# Patient Record
Sex: MALE | Race: WHITE | NOT HISPANIC OR LATINO | Employment: UNEMPLOYED | URBAN - METROPOLITAN AREA
[De-identification: names, ages, dates, MRNs, and addresses within clinical notes are randomized per-mention and may not be internally consistent; named-entity substitution may affect disease eponyms.]

---

## 2021-02-19 ENCOUNTER — HOSPITAL ENCOUNTER (EMERGENCY)
Facility: HOSPITAL | Age: 21
Discharge: HOME | End: 2021-02-19
Attending: EMERGENCY MEDICINE | Admitting: EMERGENCY MEDICINE

## 2021-02-19 VITALS
SYSTOLIC BLOOD PRESSURE: 113 MMHG | DIASTOLIC BLOOD PRESSURE: 73 MMHG | TEMPERATURE: 98 F | OXYGEN SATURATION: 97 % | HEART RATE: 72 BPM | RESPIRATION RATE: 16 BRPM | HEIGHT: 68 IN

## 2021-02-19 DIAGNOSIS — F19.10 SUBSTANCE ABUSE (HCC): Primary | ICD-10-CM

## 2021-02-19 LAB
ALBUMIN SERPL-MCNC: 4.5 G/DL (ref 3.5–5.2)
ALBUMIN/GLOB SERPL: 1.6 G/DL
ALP SERPL-CCNC: 65 U/L (ref 39–117)
ALT SERPL W P-5'-P-CCNC: 22 U/L (ref 1–41)
AMPHET+METHAMPHET UR QL: POSITIVE
ANION GAP SERPL CALCULATED.3IONS-SCNC: 11.6 MMOL/L (ref 5–15)
AST SERPL-CCNC: 21 U/L (ref 1–40)
BARBITURATES UR QL SCN: NEGATIVE
BASOPHILS # BLD AUTO: 0.03 10*3/MM3 (ref 0–0.2)
BASOPHILS NFR BLD AUTO: 0.4 % (ref 0–1.5)
BENZODIAZ UR QL SCN: POSITIVE
BILIRUB SERPL-MCNC: 0.7 MG/DL (ref 0–1.2)
BILIRUB UR QL STRIP: NEGATIVE
BUN SERPL-MCNC: 14 MG/DL (ref 6–20)
BUN/CREAT SERPL: 13.9 (ref 7–25)
CALCIUM SPEC-SCNC: 8.9 MG/DL (ref 8.6–10.5)
CANNABINOIDS SERPL QL: POSITIVE
CHLORIDE SERPL-SCNC: 101 MMOL/L (ref 98–107)
CLARITY UR: CLEAR
CO2 SERPL-SCNC: 29.4 MMOL/L (ref 22–29)
COCAINE UR QL: POSITIVE
COLOR UR: YELLOW
CREAT SERPL-MCNC: 1.01 MG/DL (ref 0.76–1.27)
DEPRECATED RDW RBC AUTO: 43.1 FL (ref 37–54)
EOSINOPHIL # BLD AUTO: 0.21 10*3/MM3 (ref 0–0.4)
EOSINOPHIL NFR BLD AUTO: 2.5 % (ref 0.3–6.2)
ERYTHROCYTE [DISTWIDTH] IN BLOOD BY AUTOMATED COUNT: 12.8 % (ref 12.3–15.4)
ETHANOL BLD-MCNC: 105 MG/DL (ref 0–10)
ETHANOL UR QL: 0.1 %
GFR SERPL CREATININE-BSD FRML MDRD: 92 ML/MIN/1.73
GLOBULIN UR ELPH-MCNC: 2.8 GM/DL
GLUCOSE SERPL-MCNC: 90 MG/DL (ref 65–99)
GLUCOSE UR STRIP-MCNC: NEGATIVE MG/DL
HCT VFR BLD AUTO: 46.6 % (ref 37.5–51)
HGB BLD-MCNC: 16.3 G/DL (ref 13–17.7)
HGB UR QL STRIP.AUTO: NEGATIVE
IMM GRANULOCYTES # BLD AUTO: 0.03 10*3/MM3 (ref 0–0.05)
IMM GRANULOCYTES NFR BLD AUTO: 0.4 % (ref 0–0.5)
KETONES UR QL STRIP: ABNORMAL
LEUKOCYTE ESTERASE UR QL STRIP.AUTO: NEGATIVE
LYMPHOCYTES # BLD AUTO: 2.1 10*3/MM3 (ref 0.7–3.1)
LYMPHOCYTES NFR BLD AUTO: 24.6 % (ref 19.6–45.3)
MCH RBC QN AUTO: 32.7 PG (ref 26.6–33)
MCHC RBC AUTO-ENTMCNC: 35 G/DL (ref 31.5–35.7)
MCV RBC AUTO: 93.6 FL (ref 79–97)
METHADONE UR QL SCN: NEGATIVE
MONOCYTES # BLD AUTO: 1.03 10*3/MM3 (ref 0.1–0.9)
MONOCYTES NFR BLD AUTO: 12.1 % (ref 5–12)
NEUTROPHILS NFR BLD AUTO: 5.12 10*3/MM3 (ref 1.7–7)
NEUTROPHILS NFR BLD AUTO: 60 % (ref 42.7–76)
NITRITE UR QL STRIP: NEGATIVE
NRBC BLD AUTO-RTO: 0 /100 WBC (ref 0–0.2)
OPIATES UR QL: NEGATIVE
OXYCODONE UR QL SCN: NEGATIVE
PH UR STRIP.AUTO: 5.5 [PH] (ref 5–8)
PLATELET # BLD AUTO: 248 10*3/MM3 (ref 140–450)
PMV BLD AUTO: 10.7 FL (ref 6–12)
POTASSIUM SERPL-SCNC: 4.1 MMOL/L (ref 3.5–5.2)
PROT SERPL-MCNC: 7.3 G/DL (ref 6–8.5)
PROT UR QL STRIP: NEGATIVE
RBC # BLD AUTO: 4.98 10*6/MM3 (ref 4.14–5.8)
SODIUM SERPL-SCNC: 142 MMOL/L (ref 136–145)
SP GR UR STRIP: 1.02 (ref 1–1.03)
UROBILINOGEN UR QL STRIP: ABNORMAL
WBC # BLD AUTO: 8.52 10*3/MM3 (ref 3.4–10.8)

## 2021-02-19 PROCEDURE — 80307 DRUG TEST PRSMV CHEM ANLYZR: CPT | Performed by: EMERGENCY MEDICINE

## 2021-02-19 PROCEDURE — 81003 URINALYSIS AUTO W/O SCOPE: CPT | Performed by: NURSE PRACTITIONER

## 2021-02-19 PROCEDURE — 85025 COMPLETE CBC W/AUTO DIFF WBC: CPT | Performed by: NURSE PRACTITIONER

## 2021-02-19 PROCEDURE — 90791 PSYCH DIAGNOSTIC EVALUATION: CPT | Performed by: MARRIAGE & FAMILY THERAPIST

## 2021-02-19 PROCEDURE — 82077 ASSAY SPEC XCP UR&BREATH IA: CPT | Performed by: EMERGENCY MEDICINE

## 2021-02-19 PROCEDURE — 80053 COMPREHEN METABOLIC PANEL: CPT | Performed by: NURSE PRACTITIONER

## 2021-02-19 PROCEDURE — 99283 EMERGENCY DEPT VISIT LOW MDM: CPT

## 2021-02-19 NOTE — ED PROVIDER NOTES
EMERGENCY DEPARTMENT ENCOUNTER    Room Number:  10/10  Date of encounter:  2/19/2021  PCP: Provider, No Known  Historian: Patient and mother      PPE     Patient was placed in face mask in first look. Patient was wearing facemask when I entered the room and throughout our encounter. I wore full protective equipment throughout this patient encounter including a face mask, and gloves. Hand hygiene was performed before donning protective equipment and after removal when leaving the room.        HPI:  Chief Complaint: Possible substance abuse  A complete HPI/ROS/PMH/PSH/SH/FH are unobtainable due to: Patient not forthcoming with information    Context: Harshal Alcaraz is a 21 y.o. male who arrives to the ED via private vehicle with his mother.  Patient presents with his mom in the waiting room for what appears to be depression and possible substance abuse.  Patient states that he slept in his mom's car last night and she was concerned so she brought him here to the ER.  After speaking with patient's mom who is in the waiting room she states patient has made multiple comments about suicide.  She states that he told her he has made his own obituary, she states he has been referring to a good friend of his that committed suicide.  She states she is concerned that he has been using heroin, states he told her that he has not slept in multiple night.  Mom states therefore she brought him in to be evaluated.      PAST MEDICAL HISTORY  Active Ambulatory Problems     Diagnosis Date Noted   • No Active Ambulatory Problems     Resolved Ambulatory Problems     Diagnosis Date Noted   • No Resolved Ambulatory Problems     No Additional Past Medical History         PAST SURGICAL HISTORY  No past surgical history on file.      FAMILY HISTORY  No family history on file.      SOCIAL HISTORY  Social History     Socioeconomic History   • Marital status: Single     Spouse name: Not on file   • Number of children: Not on file   • Years  of education: Not on file   • Highest education level: Not on file   Tobacco Use   • Smoking status: Never Smoker         ALLERGIES  Patient has no known allergies.        REVIEW OF SYSTEMS  Review of Systems   Limited secondary to patient not being forthcoming with information       PHYSICAL EXAM    ED Triage Vitals   Temp Heart Rate Resp BP SpO2   02/19/21 0726 02/19/21 0714 02/19/21 0714 02/19/21 0726 02/19/21 0714   98 °F (36.7 °C) 81 18 130/87 97 %       Physical Exam  GENERAL: Disheveled appearance, non-toxic appearing, not distressed  HENT: normocephalic, atraumatic  EYES: no scleral icterus, PERRL  CV: regular rhythm, regular rate, no murmur  RESPIRATORY: normal effort, CTAB  ABDOMEN: soft   MUSCULOSKELETAL: no deformity  NEURO: alert, moves all extremities, follows commands, mental status normal/baseline  SKIN: warm, dry, no rash   Psych: Flat affect, patient denies SI or HI  Nursing notes and vital signs reviewed      LAB RESULTS  Recent Results (from the past 24 hour(s))   Ethanol    Collection Time: 02/19/21  7:36 AM    Specimen: Blood   Result Value Ref Range    Ethanol 105 (H) 0 - 10 mg/dL    Ethanol % 0.105 %   Comprehensive Metabolic Panel    Collection Time: 02/19/21  7:36 AM    Specimen: Blood   Result Value Ref Range    Glucose 90 65 - 99 mg/dL    BUN 14 6 - 20 mg/dL    Creatinine 1.01 0.76 - 1.27 mg/dL    Sodium 142 136 - 145 mmol/L    Potassium 4.1 3.5 - 5.2 mmol/L    Chloride 101 98 - 107 mmol/L    CO2 29.4 (H) 22.0 - 29.0 mmol/L    Calcium 8.9 8.6 - 10.5 mg/dL    Total Protein 7.3 6.0 - 8.5 g/dL    Albumin 4.50 3.50 - 5.20 g/dL    ALT (SGPT) 22 1 - 41 U/L    AST (SGOT) 21 1 - 40 U/L    Alkaline Phosphatase 65 39 - 117 U/L    Total Bilirubin 0.7 0.0 - 1.2 mg/dL    eGFR Non African Amer 92 >60 mL/min/1.73    Globulin 2.8 gm/dL    A/G Ratio 1.6 g/dL    BUN/Creatinine Ratio 13.9 7.0 - 25.0    Anion Gap 11.6 5.0 - 15.0 mmol/L   CBC Auto Differential    Collection Time: 02/19/21  8:27 AM     Specimen: Blood   Result Value Ref Range    WBC 8.52 3.40 - 10.80 10*3/mm3    RBC 4.98 4.14 - 5.80 10*6/mm3    Hemoglobin 16.3 13.0 - 17.7 g/dL    Hematocrit 46.6 37.5 - 51.0 %    MCV 93.6 79.0 - 97.0 fL    MCH 32.7 26.6 - 33.0 pg    MCHC 35.0 31.5 - 35.7 g/dL    RDW 12.8 12.3 - 15.4 %    RDW-SD 43.1 37.0 - 54.0 fl    MPV 10.7 6.0 - 12.0 fL    Platelets 248 140 - 450 10*3/mm3    Neutrophil % 60.0 42.7 - 76.0 %    Lymphocyte % 24.6 19.6 - 45.3 %    Monocyte % 12.1 (H) 5.0 - 12.0 %    Eosinophil % 2.5 0.3 - 6.2 %    Basophil % 0.4 0.0 - 1.5 %    Immature Grans % 0.4 0.0 - 0.5 %    Neutrophils, Absolute 5.12 1.70 - 7.00 10*3/mm3    Lymphocytes, Absolute 2.10 0.70 - 3.10 10*3/mm3    Monocytes, Absolute 1.03 (H) 0.10 - 0.90 10*3/mm3    Eosinophils, Absolute 0.21 0.00 - 0.40 10*3/mm3    Basophils, Absolute 0.03 0.00 - 0.20 10*3/mm3    Immature Grans, Absolute 0.03 0.00 - 0.05 10*3/mm3    nRBC 0.0 0.0 - 0.2 /100 WBC   Urine Drug Screen - Urine, Clean Catch    Collection Time: 02/19/21  8:34 AM    Specimen: Urine, Clean Catch   Result Value Ref Range    Amphet/Methamphet, Screen Positive (A) Negative    Barbiturates Screen, Urine Negative Negative    Benzodiazepine Screen, Urine Positive (A) Negative    Cocaine Screen, Urine Positive (A) Negative    Opiate Screen Negative Negative    THC, Screen, Urine Positive (A) Negative    Methadone Screen, Urine Negative Negative    Oxycodone Screen, Urine Negative Negative   Urinalysis With Microscopic If Indicated (No Culture) - Urine, Clean Catch    Collection Time: 02/19/21  8:34 AM    Specimen: Urine, Clean Catch   Result Value Ref Range    Color, UA Yellow Yellow, Straw    Appearance, UA Clear Clear    pH, UA 5.5 5.0 - 8.0    Specific Gravity, UA 1.025 1.005 - 1.030    Glucose, UA Negative Negative    Ketones, UA Trace (A) Negative    Bilirubin, UA Negative Negative    Blood, UA Negative Negative    Protein, UA Negative Negative    Leuk Esterase, UA Negative Negative    Nitrite,  UA Negative Negative    Urobilinogen, UA 1.0 E.U./dL 0.2 - 1.0 E.U./dL       Ordered the above labs and independently reviewed the results.      RADIOLOGY  No Radiology Exams Resulted Within Past 24 Hours    I ordered the above noted radiological studies and viewed the images on the PACS system.         MEDICAL RECORD REVIEW  Medical records reviewed in epic      PROCEDURES    Procedures        DIFFERENTIAL DIAGNOSIS  Differential diagnosis include but not limited to the following: Induced psychosis, substance abuse, depression, SI      PROGRESS, DATA ANALYSIS, CONSULTS, AND MEDICAL DECISION MAKING        ED Course as of Feb 19 1511   Fri Feb 19, 2021   0749 We will obtain labs and have access evaluate patient.    [MS]   0749 Reviewed pt's history and workup with Dr. Howard.  After a bedside evaluation, they agree with the plan of care.          [MS]   0816 Ethanol %: 0.105 [DC]   0816 Creatinine: 1.01 [DC]   0948 Amphet/Methamphet, Screen(!): Positive [MS]   0949 Benzodiazepine Screen, Urine(!): Positive [MS]   0949 Cocaine Screen, Urine(!): Positive [MS]   0949 Discussed patient with angela Escobar , she is here to evaluate patient at this time.   THC Screen, Urine(!): Positive [MS]   1347 Access has evaluated the patient, at this point patient is not having any SI, they do not feel he warrants any inpatient hospitalization at this time.  I am comfortable with discharge at this time.  ED return for worsening symptoms including but not limited to thoughts or concern for self-harm or harm to others.    [DC]      ED Course User Index  [DC] Ian Howard MD  [MS] Anju Giraldo APRN     Discussed plan for discharge, as there is no emergent indication for admission. Pt/family is agreeable and understands need for follow up and repeat testing.  Pt is aware that discharge does not mean that nothing is wrong but it indicates no emergency is present that requires admission and they must continue care  with follow-up as given below or physician of their choice.   Patient/Family voiced understanding of above instructions.  Patient discharged in stable condition.    DIAGNOSIS  Final diagnoses:   Substance abuse (CMS/[facility])       FOLLOW UP   [address] [address]  [phone]  Schedule an appointment as soon as possible for a visit   As needed      RX     Medication List      Stop    albuterol sulfate  (90 Base) MCG/ACT inhaler  Commonly known as: PROVENTIL HFA;VENTOLIN HFA;PROAIR HFA     clarithromycin 500 MG tablet  Commonly known as: BIAXIN     guaiFENesin-dextromethorphan 100-10 MG/5ML syrup  Commonly known as: ROBITUSSIN DM              MEDICATIONS GIVEN IN ED    Medications - No data to display        COURSE & MEDICAL DECISION MAKING  Any/All labs and Any/All Imaging studies that were ordered were reviewed and are noted above.  Results were reviewed/discussed with the patient and they were also made aware of online access.    Pt also made aware that some labs, such as cultures, will not be resulted during ER visit and follow up with PMD is necessary.        Anju Giraldo, APRN  02/19/21 1514

## 2021-02-19 NOTE — ED PROVIDER NOTES
Pt presents to the ED c/o  depression and substance abuse, mother reported possible SI, patient denies any SI or HI.  Admits to multiple substances used.     On exam,   General: Awake, alert, no acute distress  HEENT: Mucous membranes moist, atraumatic, EOMI  Neck: Full ROM  Pulm: Symmetric chest rise, nonlabored, lungs CTAB  Cardiovascular: Regular rate and rhythm, intact distal pulses  GI: Soft, nontender, nondistended, no rebound, no guarding, bowel sounds present  MSK: Full ROM, no deformity  Skin: Warm, dry  Neuro: Alert and oriented x 3, GCS 15, moving all extremities, no focal deficits  Psych: Calm, cooperative      Surgical mask, protective eye goggles, and gloves used during this encounter. Patient in surgical mask.      Plan:   ED Course as of Feb 19 1426   Fri Feb 19, 2021   0749 We will obtain labs and have access evaluate patient.    [MS]   0749 Reviewed pt's history and workup with Dr. Howard.  After a bedside evaluation, they agree with the plan of care.          [MS]   0816 Ethanol %: 0.105 [DC]   0816 Creatinine: 1.01 [DC]   0948 Amphet/Methamphet, Screen(!): Positive [MS]   0949 Benzodiazepine Screen, Urine(!): Positive [MS]   0949 Cocaine Screen, Urine(!): Positive [MS]   0949 Discussed patient with angela Escobar , she is here to evaluate patient at this time.   THC Screen, Urine(!): Positive [MS]   1347 Access has evaluated the patient, at this point patient is not having any SI, they do not feel he warrants any inpatient hospitalization at this time.  I am comfortable with discharge at this time.  ED return for worsening symptoms including but not limited to thoughts or concern for self-harm or harm to others.    [DC]      ED Course User Index  [DC] Ian Howard MD  [MS] Anju Giraldo, ROCAEL     Plan for labs and access evaluation.    Access has cleared the patient for discharge, most of this appears to be substance use he is not interested and seeking any resources at this  time.  No SI or HI.  Safe for discharge.     Attestation:  The SAW and I have discussed this patient's history, physical exam, and treatment plan.  I have reviewed the documentation and personally had a face to face interaction with the patient. I affirm the documentation and agree with the treatment and plan.  The attached note describes my personal findings.          Ian Howard MD  02/19/21 0446

## 2021-02-19 NOTE — CONSULTS
"Access Ctr Consult.    Chart reviewed.    Full PPE, including mask and eyewear (gown and gloves when necessary), worn throughout encounter.  Patient wore no mask.  Appropriate hand hygiene performed before and after patient contact and donning/doffing PPE.    Met with patient in ED #10.  On initial approach found patient sleeping and he would not wake.  Returned several hours later for assessment.  Patient did wake up, however kept eyes closed throughout the vast majority of interview.  Introduced self and explained role.  Patient agreed to interview.  All information per patient report, unless otherwise noted.    Patient is a 21-year-old S/W/M.  He has no children, he is currently unemployed and is recently homeless.  For the past 2 weeks he has been \"couch hopping\".    Access center consulted due to concerns over depression and SI as reported by patient's mother.  While uninterested, patient was cooperative with interview.  \"My mother brought me (to ED) because I don't have nowhere to stay\".  Patient has been through several past outpatient treatment experiences, both psychiatric and substance use related.  Patient stated he is tried several different things, incl \"multiple different mood stabilizers and nothing helps\".  Patient last individual outpatient counseling experience was 4 months ago & he was in [facility] \"a few months ago\" @ [facility].  Patient is currently prescribed Lamotrigine and Adderall.  Patient his not compliant w/medication. He last took Lamotrigine 2 months ago because \"I don't feel the need to\". Patient takes his Adderall \"as needed - only on really hard days.\"    Pt uses THC \"occasionally\", further clarifying that 2 grams of THC can last him \"one & a half to one week.\" Pt drinks EtOH. Pt has tried numerous other substances incl, Cocaine, Mushrooms & LSD.  Pt last drank & used THC last night. BAL was .107 on arrival to ED & UDS was positive for Amphetamine, Cocaine, THC & Benzodiazepines. Pt " "asked this writer what drugs were in his urine, stating \"I'll tell you everything I take.\" Pt was surprised to hear of the Cocaine & Benzos, stating, \"Then I was laced. I'll tell you everything I do & I didn't do (Cocaine/Benzos). I've never touched any Benzos\". Pt purchased his most recent batch of weed from an unknown source. Pt has been through.  Patient has no desire to change his alcohol or marijuana use.    No SI/HI or wish to die.  Patient has experienced SI in the past, stating the last time he had such thoughts was \"a couple years ago\".  In reading friend's obit, it occurred to Pt that that was nearly his story and it could have been his obituary.  Contradicting what Pt's mother reported, Pt denied having written his own obituary.  Pt did confirm a recent focus on the suicide of a friend. That friend's birthday was two days ago, . While Pt has \"known of (friend's) death for a little while, I just actually read his obituary and whoa, the synchronicities in life.\"  Pt pointed out that friend  around the time Pt was really struggling with his own SI. Pt said it could have been his own obituary that he was reading. Pt said he has so much to live for. He named his current on line courses he's taking, plans to enter job [employer] to become an , wanting a family (partner & kids). \"I have a 2 year, 5 year and 10 year plans.\"       Pt does sleep an excessive amount, \"20 hours a day, 5 days a week. But it's not depression. It's like I have the metabolism of a fucking Koala.\"   Appetite is WNL.  With 10 being most, patient rated current depression \"4-1/2 or a 5\" & anxiety \"1.\"     Asked to allow mother to come in due to her concerns about Pt's safety. While reluctant, Pt agreed to such, specifying he didn't want his mother to know about his UDS. This writer agreed. Unfortunately, this writer was unable to reach mother to participate in Pt's assessment.     Pt is uninterested in any tx referrals, " reiterating that it doesn't help him. Pt does not meet criteria for inpt admission, therefore referred back to medical. Gave report to ED APRN, Anju Solis & MD, Ian Howard, both of whom agreed with disposition.

## 2021-02-19 NOTE — ED NOTES
"Pt brought by mother and escorted with police and security. According to the mother the patient has been suicidal, however the patient denies being suicidal at this time. Patient states \"I have been under a lot of stress.\"      Job, THAI Thomas  02/19/21 2118    "

## 2021-02-19 NOTE — DISCHARGE INSTRUCTIONS
Recommend cessation of alcohol and drug use, or at the least avoid heavy alcohol consumption. Strongly recommend alcohol rehabilitation/ detox: see enclosed list of centers (you must make the calls yourself for entrance into the program). Return immediately if you develop signs of withdrawal.  You have been seen for substance abuse related complaint.  Please stay with a responsible adult for 24 hours.  Please avoid driving for 24 hours.  Please quit taking alcohol/drugs, as it can result in your death or permanent disability.    Substance abuse treatment centers  The following list are some of the available alcohol and drug abuse treatment centers in the [facility] area.  This list is by no means inclusive of all available options, nor does it imply that these options are better than any other in the area.  Cost and available treatment plans to vary from site to site.  You will need to call, to see which if any of these options will work for your needs.    [facility]  [address] [facility] [address]  [phone]    [facility]  [address] [place] [address]  [phone]    [facility]  [address] [facility] [address]    [facility] [address]  [phone]    [facility]-medical stabilization service  [address] [address]  [phone]    [facility], Addiction recovery  [facility]  [address] [place] [address]  [phone]  [facility]  [address] [place] [address]  [phone]    [facility] [address] [id number] [phone]

## 2021-03-02 ENCOUNTER — HOSPITAL ENCOUNTER (EMERGENCY)
Facility: HOSPITAL | Age: 21
Discharge: HOME | End: 2021-03-03
Attending: EMERGENCY MEDICINE | Admitting: EMERGENCY MEDICINE

## 2021-03-02 ENCOUNTER — APPOINTMENT (OUTPATIENT)
Dept: CT IMAGING | Facility: HOSPITAL | Age: 21
End: 2021-03-02

## 2021-03-02 VITALS
OXYGEN SATURATION: 100 % | DIASTOLIC BLOOD PRESSURE: 92 MMHG | HEIGHT: 68 IN | TEMPERATURE: 98.1 F | BODY MASS INDEX: 25.76 KG/M2 | RESPIRATION RATE: 20 BRPM | SYSTOLIC BLOOD PRESSURE: 137 MMHG | WEIGHT: 170 LBS | HEART RATE: 77 BPM

## 2021-03-02 DIAGNOSIS — F19.10 DRUG ABUSE (HCC): Primary | ICD-10-CM

## 2021-03-02 LAB
ALBUMIN SERPL-MCNC: 4.9 G/DL (ref 3.5–5.2)
ALBUMIN/GLOB SERPL: 1.7 G/DL
ALP SERPL-CCNC: 67 U/L (ref 39–117)
ALT SERPL W P-5'-P-CCNC: 17 U/L (ref 1–41)
ANION GAP SERPL CALCULATED.3IONS-SCNC: 12.4 MMOL/L (ref 5–15)
AST SERPL-CCNC: 21 U/L (ref 1–40)
BASOPHILS # BLD AUTO: 0.03 10*3/MM3 (ref 0–0.2)
BASOPHILS NFR BLD AUTO: 0.4 % (ref 0–1.5)
BILIRUB SERPL-MCNC: 1.2 MG/DL (ref 0–1.2)
BUN SERPL-MCNC: 9 MG/DL (ref 6–20)
BUN/CREAT SERPL: 10.1 (ref 7–25)
CALCIUM SPEC-SCNC: 9.4 MG/DL (ref 8.6–10.5)
CHLORIDE SERPL-SCNC: 99 MMOL/L (ref 98–107)
CO2 SERPL-SCNC: 27.6 MMOL/L (ref 22–29)
CREAT SERPL-MCNC: 0.89 MG/DL (ref 0.76–1.27)
DEPRECATED RDW RBC AUTO: 45.6 FL (ref 37–54)
EOSINOPHIL # BLD AUTO: 0.04 10*3/MM3 (ref 0–0.4)
EOSINOPHIL NFR BLD AUTO: 0.5 % (ref 0.3–6.2)
ERYTHROCYTE [DISTWIDTH] IN BLOOD BY AUTOMATED COUNT: 13.2 % (ref 12.3–15.4)
ETHANOL BLD-MCNC: <10 MG/DL (ref 0–10)
ETHANOL UR QL: <0.01 %
GFR SERPL CREATININE-BSD FRML MDRD: 107 ML/MIN/1.73
GLOBULIN UR ELPH-MCNC: 2.9 GM/DL
GLUCOSE SERPL-MCNC: 92 MG/DL (ref 65–99)
HCT VFR BLD AUTO: 50.5 % (ref 37.5–51)
HGB BLD-MCNC: 17.1 G/DL (ref 13–17.7)
IMM GRANULOCYTES # BLD AUTO: 0.03 10*3/MM3 (ref 0–0.05)
IMM GRANULOCYTES NFR BLD AUTO: 0.4 % (ref 0–0.5)
LYMPHOCYTES # BLD AUTO: 1.7 10*3/MM3 (ref 0.7–3.1)
LYMPHOCYTES NFR BLD AUTO: 20.4 % (ref 19.6–45.3)
MCH RBC QN AUTO: 31.8 PG (ref 26.6–33)
MCHC RBC AUTO-ENTMCNC: 33.9 G/DL (ref 31.5–35.7)
MCV RBC AUTO: 93.9 FL (ref 79–97)
MONOCYTES # BLD AUTO: 0.73 10*3/MM3 (ref 0.1–0.9)
MONOCYTES NFR BLD AUTO: 8.8 % (ref 5–12)
NEUTROPHILS NFR BLD AUTO: 5.81 10*3/MM3 (ref 1.7–7)
NEUTROPHILS NFR BLD AUTO: 69.5 % (ref 42.7–76)
NRBC BLD AUTO-RTO: 0 /100 WBC (ref 0–0.2)
PLATELET # BLD AUTO: 295 10*3/MM3 (ref 140–450)
PMV BLD AUTO: 10.6 FL (ref 6–12)
POTASSIUM SERPL-SCNC: 3.8 MMOL/L (ref 3.5–5.2)
PROT SERPL-MCNC: 7.8 G/DL (ref 6–8.5)
RBC # BLD AUTO: 5.38 10*6/MM3 (ref 4.14–5.8)
SODIUM SERPL-SCNC: 139 MMOL/L (ref 136–145)
WBC # BLD AUTO: 8.34 10*3/MM3 (ref 3.4–10.8)

## 2021-03-02 PROCEDURE — 99283 EMERGENCY DEPT VISIT LOW MDM: CPT

## 2021-03-02 PROCEDURE — 80053 COMPREHEN METABOLIC PANEL: CPT | Performed by: PHYSICIAN ASSISTANT

## 2021-03-02 PROCEDURE — 85025 COMPLETE CBC W/AUTO DIFF WBC: CPT | Performed by: PHYSICIAN ASSISTANT

## 2021-03-02 PROCEDURE — 82077 ASSAY SPEC XCP UR&BREATH IA: CPT | Performed by: PHYSICIAN ASSISTANT

## 2021-03-02 PROCEDURE — 63710000001 ONDANSETRON ODT 4 MG TABLET DISPERSIBLE: Performed by: PHYSICIAN ASSISTANT

## 2021-03-02 PROCEDURE — 70450 CT HEAD/BRAIN W/O DYE: CPT

## 2021-03-02 RX ORDER — ONDANSETRON 4 MG/1
4 TABLET, ORALLY DISINTEGRATING ORAL ONCE
Status: COMPLETED | OUTPATIENT
Start: 2021-03-02 | End: 2021-03-02

## 2021-03-02 RX ADMIN — SODIUM CHLORIDE 1000 ML: 9 INJECTION, SOLUTION INTRAVENOUS at 23:34

## 2021-03-02 RX ADMIN — ONDANSETRON 4 MG: 4 TABLET, ORALLY DISINTEGRATING ORAL at 23:28

## 2021-03-03 PROCEDURE — 90791 PSYCH DIAGNOSTIC EVALUATION: CPT

## 2021-03-03 RX ORDER — ONDANSETRON 4 MG/1
4 TABLET, ORALLY DISINTEGRATING ORAL 4 TIMES DAILY PRN
Qty: 8 TABLET | Refills: 0 | Status: SHIPPED | OUTPATIENT
Start: 2021-03-03 | End: 2021-03-07

## 2021-03-03 NOTE — DISCHARGE INSTRUCTIONS
It is very important that you stop taking drugs.  Call patient connection tomorrow to establish a primary care doctor.  Return to the ER if develop any concerning or worsening symptoms.
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
## 2021-03-03 NOTE — ED NOTES
Per THOM Torres, pt and his mother are requesting to speak w/ . This CN spoke to DANIELLE Esqueda RN, who is paging the  for this pt.      Kianna Marinelli RN  03/02/21 4287
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
## 2021-06-01 ENCOUNTER — APPOINTMENT (OUTPATIENT)
Dept: GENERAL RADIOLOGY | Facility: HOSPITAL | Age: 21
End: 2021-06-01

## 2021-06-01 ENCOUNTER — HOSPITAL ENCOUNTER (EMERGENCY)
Facility: HOSPITAL | Age: 21
Discharge: HOME | End: 2021-06-01
Attending: EMERGENCY MEDICINE | Admitting: EMERGENCY MEDICINE

## 2021-06-01 VITALS
RESPIRATION RATE: 16 BRPM | HEIGHT: 68 IN | SYSTOLIC BLOOD PRESSURE: 137 MMHG | WEIGHT: 180.34 LBS | TEMPERATURE: 98.7 F | HEART RATE: 60 BPM | DIASTOLIC BLOOD PRESSURE: 78 MMHG | BODY MASS INDEX: 27.33 KG/M2 | OXYGEN SATURATION: 98 %

## 2021-06-01 DIAGNOSIS — S61.411A LACERATION OF MULTIPLE SITES OF RIGHT HAND AND FINGERS, INITIAL ENCOUNTER: ICD-10-CM

## 2021-06-01 DIAGNOSIS — S61.219A LACERATION OF MULTIPLE SITES OF RIGHT HAND AND FINGERS, INITIAL ENCOUNTER: ICD-10-CM

## 2021-06-01 DIAGNOSIS — S62.357A CLOSED NONDISPLACED FRACTURE OF SHAFT OF FIFTH METACARPAL BONE OF LEFT HAND, INITIAL ENCOUNTER: Primary | ICD-10-CM
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
## 2021-06-01 PROCEDURE — 99283 EMERGENCY DEPT VISIT LOW MDM: CPT

## 2021-06-01 PROCEDURE — 73130 X-RAY EXAM OF HAND: CPT

## 2021-06-01 RX ORDER — HYDROCODONE BITARTRATE AND ACETAMINOPHEN 7.5; 325 MG/1; MG/1
1 TABLET ORAL EVERY 6 HOURS PRN
Qty: 20 TABLET | Refills: 0 | Status: SHIPPED | OUTPATIENT
Start: 2021-06-01

## 2021-06-01 RX ORDER — AMOXICILLIN AND CLAVULANATE POTASSIUM 875; 125 MG/1; MG/1
1 TABLET, FILM COATED ORAL EVERY 12 HOURS SCHEDULED
Status: COMPLETED | OUTPATIENT
Start: 2021-06-01 | End: 2021-06-01

## 2021-06-01 RX ORDER — AMOXICILLIN AND CLAVULANATE POTASSIUM 875; 125 MG/1; MG/1
1 TABLET, FILM COATED ORAL 2 TIMES DAILY
Qty: 14 TABLET | Refills: 0 | Status: SHIPPED | OUTPATIENT
Start: 2021-06-01

## 2021-06-01 RX ORDER — LIDOCAINE HYDROCHLORIDE 10 MG/ML
10 INJECTION, SOLUTION INFILTRATION; PERINEURAL ONCE
Status: DISCONTINUED | OUTPATIENT
Start: 2021-06-01 | End: 2021-06-01 | Stop reason: HOSPADM

## 2021-06-01 RX ADMIN — AMOXICILLIN AND CLAVULANATE POTASSIUM 1 TABLET: 875; 125 TABLET, FILM COATED ORAL at 06:50

## 2021-06-01 NOTE — ED PROVIDER NOTES
Subjective   21-year-old male presents with bilateral hand pain and left hand lacerations status post breaking up a fight between his dog and a neighborhood dog.  Patient states the other dog appeared well but entered his yard and began fighting with his dog.  Pain is moderate, worse with movement.  Tetanus is up-to-date.  Patient declines rabies vaccination.            Review of Systems   Musculoskeletal:        As per HPI   Skin: Positive for wound.   Neurological: Negative.        No past medical history on file.    No Known Allergies    No past surgical history on file.    No family history on file.    Social History     Socioeconomic History   • Marital status: Single     Spouse name: Not on file   • Number of children: Not on file   • Years of education: Not on file   • Highest education level: Not on file   Tobacco Use   • Smoking status: Never Smoker           Objective   Physical Exam  Constitutional:       Appearance: Normal appearance.   HENT:      Head: Normocephalic and atraumatic.   Musculoskeletal:      Comments: Left lateral hand swelling and tenderness over the fifth metacarpal, no wounds to the left hand appreciated.  Right hand with scattered lacerations.   Skin:     Capillary Refill: Capillary refill takes less than 2 seconds.   Neurological:      Mental Status: He is alert and oriented to person, place, and time.      Sensory: No sensory deficit.      Motor: No weakness.   Psychiatric:         Behavior: Behavior normal.         Laceration Repair    Date/Time: 6/1/2021 8:28 AM  Performed by: Saturnino Vázquez MD  Authorized by: Saturnino Vázquez MD     Consent:     Consent obtained:  Verbal    Consent given by:  Patient  Anesthesia (see MAR for exact dosages):     Anesthesia method:  Local infiltration    Local anesthetic:  Lidocaine 1% w/o epi  Laceration details:     Location:  Hand    Hand location:  R hand, dorsum    Length (cm):  1  Repair type:     Repair type:  Simple  Pre-procedure details:      Preparation:  Patient was prepped and draped in usual sterile fashion  Exploration:     Wound exploration: wound explored through full range of motion      Contaminated: no    Treatment:     Amount of cleaning:  Standard    Irrigation solution:  Sterile saline  Skin repair:     Repair method:  Sutures    Suture size:  5-0    Suture material:  Nylon    Suture technique:  Simple interrupted    Number of sutures:  1  Approximation:     Approximation:  Loose  Post-procedure details:     Dressing:  Antibiotic ointment    Patient tolerance of procedure:  Tolerated well, no immediate complications  Laceration Repair    Date/Time: 6/1/2021 8:29 AM  Performed by: Saturnino Vázquez MD  Authorized by: Saturnino Vázquez MD     Consent:     Consent obtained:  Verbal    Consent given by:  Patient  Anesthesia (see MAR for exact dosages):     Anesthesia method:  Local infiltration    Local anesthetic:  Lidocaine 1% w/o epi  Laceration details:     Location: Webspace between fourth and fifth digits of right hand.    Length (cm):  2  Repair type:     Repair type:  Simple  Pre-procedure details:     Preparation:  Patient was prepped and draped in usual sterile fashion  Treatment:     Area cleansed with:  Hibiclens    Amount of cleaning:  Standard    Irrigation solution:  Sterile saline  Skin repair:     Repair method:  Sutures    Suture size:  5-0    Suture material:  Nylon    Number of sutures:  3  Approximation:     Approximation:  Loose  Post-procedure details:     Dressing:  Antibiotic ointment    Patient tolerance of procedure:  Tolerated well, no immediate complications  Laceration Repair    Date/Time: 6/1/2021 8:30 AM  Performed by: Saturnino Vázquez MD  Authorized by: Saturnino Vázquez MD     Consent:     Consent given by:  Patient  Anesthesia (see MAR for exact dosages):     Anesthesia method:  Local infiltration    Local anesthetic:  Lidocaine 1% w/o epi  Laceration details:     Location:  Finger    Finger location:  R long  finger    Length (cm):  1  Repair type:     Repair type:  Simple  Pre-procedure details:     Preparation:  Patient was prepped and draped in usual sterile fashion  Exploration:     Wound exploration: wound explored through full range of motion    Treatment:     Area cleansed with:  Hibiclens    Amount of cleaning:  Standard    Irrigation solution:  Sterile saline  Skin repair:     Repair method:  Sutures    Suture size:  5-0    Suture material:  Nylon    Suture technique:  Simple interrupted    Number of sutures:  2  Approximation:     Approximation:  Loose  Post-procedure details:     Dressing:  Antibiotic ointment    Patient tolerance of procedure:  Tolerated well, no immediate complications  Splint - Cast - Strapping    Date/Time: 6/1/2021 8:32 AM  Performed by: Saturnino Vázquez MD  Authorized by: Saturnino Vázquez MD     Consent:     Consent obtained:  Verbal  Pre-procedure details:     Sensation:  Normal  Procedure details:     Laterality:  Left    Location:  Hand    Hand:  L hand    Strapping: no      Splint type:  Ulnar gutter    Supplies:  Ortho-Glass  Post-procedure details:     Pain:  Unchanged    Sensation:  Normal    Patient tolerance of procedure:  Tolerated well, no immediate complications               ED Course                                           MDM  Number of Diagnoses or Management Options  Closed nondisplaced fracture of shaft of fifth metacarpal bone of left hand, initial encounter  Dog bite, initial encounter  Laceration of multiple sites of right hand and fingers, initial encounter  Diagnosis management comments: XR Hand 3+ View Bilateral   ED Interpretation    Left hand 5th MC fracture      Final Result    Right hand. Soft tissue swelling and soft tissue gas at the medial hand    without acute osseous abnormality or radiodense foreign body.         Left hand.    1. Acute/subacute nondisplaced fifth metacarpal shaft fracture.    2. Old healed fifth metacarpal shaft fracture.          Electronically Signed By-Ian Zheng MD On:6/1/2021 7:02 AM    This report was finalized on [id number] by  Ian Zheng MD.     3 wounds that were gaping were closed loosely and irrigated copiously with normal saline, patient counseled extensively as to the risk of infection and the need for tension wound care, will return if any sign or symptom of infection is present.  Otherwise patient placed in an ulnar gutter splint for fifth metacarpal fracture.  Patient again declines rabies prophylaxis.  Dog bite form done by nursing.  Inspect reviewed.  Patient will be placed on Augmentin and hydrocodone as needed.       Amount and/or Complexity of Data Reviewed  Tests in the radiology section of CPT®: ordered and reviewed  Independent visualization of images, tracings, or specimens: yes        Final diagnoses:   Closed nondisplaced fracture of shaft of fifth metacarpal bone of left hand, initial encounter   Laceration of multiple sites of right hand and fingers, initial encounter   Dog bite, initial encounter       ED Disposition  ED Disposition     ED Disposition Condition Comment    Discharge Stable           [facility] - [address]  [facility] [address] [phone]  Schedule an appointment as soon as possible for a visit            Medication List      New Prescriptions    amoxicillin-clavulanate 875-125 MG per tablet  Commonly known as: AUGMENTIN  Take 1 tablet by mouth 2 (Two) Times a Day.     HYDROcodone-acetaminophen 7.5-325 MG per tablet  Commonly known as: NORCO  Take 1 tablet by mouth Every 6 (Six) Hours As Needed for Moderate Pain .           Where to Get Your Medications      These medications were sent to [email or web address][id number] - [facility] [place] - [address] AT [facility] OF [facility] [address] - [phone]  - [phone]   [address] [place] [address]    Phone: [phone]   · amoxicillin-clavulanate 875-125 MG per  tablet  · HYDROcodone-acetaminophen 7.5-325 MG per tablet          Saturnino Vázquez MD  06/01/21 0842

## 2023-06-16 NOTE — NURSING NOTE
Patient states was bitten by dog, isn't sure whose dog. Admits to drinking this evening states that he stopped about 0230.   [facility]